# Patient Record
Sex: FEMALE | Race: OTHER | HISPANIC OR LATINO | URBAN - METROPOLITAN AREA
[De-identification: names, ages, dates, MRNs, and addresses within clinical notes are randomized per-mention and may not be internally consistent; named-entity substitution may affect disease eponyms.]

---

## 2020-12-02 ENCOUNTER — EMERGENCY (EMERGENCY)
Facility: HOSPITAL | Age: 45
LOS: 1 days | Discharge: HOME | End: 2020-12-02
Attending: EMERGENCY MEDICINE | Admitting: EMERGENCY MEDICINE
Payer: MEDICAID

## 2020-12-02 VITALS
TEMPERATURE: 97 F | HEART RATE: 78 BPM | RESPIRATION RATE: 16 BRPM | OXYGEN SATURATION: 100 % | SYSTOLIC BLOOD PRESSURE: 129 MMHG | DIASTOLIC BLOOD PRESSURE: 85 MMHG

## 2020-12-02 VITALS
TEMPERATURE: 98 F | SYSTOLIC BLOOD PRESSURE: 132 MMHG | HEART RATE: 82 BPM | RESPIRATION RATE: 18 BRPM | OXYGEN SATURATION: 100 % | DIASTOLIC BLOOD PRESSURE: 90 MMHG

## 2020-12-02 DIAGNOSIS — G93.5 COMPRESSION OF BRAIN: ICD-10-CM

## 2020-12-02 LAB
ALBUMIN SERPL ELPH-MCNC: 4.4 G/DL — SIGNIFICANT CHANGE UP (ref 3.3–5)
ALP SERPL-CCNC: 96 U/L — SIGNIFICANT CHANGE UP (ref 40–120)
ALT FLD-CCNC: 41 U/L — HIGH (ref 4–33)
ANION GAP SERPL CALC-SCNC: 11 MMO/L — SIGNIFICANT CHANGE UP (ref 7–14)
AST SERPL-CCNC: 38 U/L — HIGH (ref 4–32)
BASOPHILS # BLD AUTO: 0.02 K/UL — SIGNIFICANT CHANGE UP (ref 0–0.2)
BASOPHILS NFR BLD AUTO: 0.4 % — SIGNIFICANT CHANGE UP (ref 0–2)
BILIRUB SERPL-MCNC: 0.2 MG/DL — SIGNIFICANT CHANGE UP (ref 0.2–1.2)
BUN SERPL-MCNC: 8 MG/DL — SIGNIFICANT CHANGE UP (ref 7–23)
CALCIUM SERPL-MCNC: 9 MG/DL — SIGNIFICANT CHANGE UP (ref 8.4–10.5)
CHLORIDE SERPL-SCNC: 100 MMOL/L — SIGNIFICANT CHANGE UP (ref 98–107)
CO2 SERPL-SCNC: 26 MMOL/L — SIGNIFICANT CHANGE UP (ref 22–31)
CREAT SERPL-MCNC: 0.53 MG/DL — SIGNIFICANT CHANGE UP (ref 0.5–1.3)
EOSINOPHIL # BLD AUTO: 0.01 K/UL — SIGNIFICANT CHANGE UP (ref 0–0.5)
EOSINOPHIL NFR BLD AUTO: 0.2 % — SIGNIFICANT CHANGE UP (ref 0–6)
GLUCOSE SERPL-MCNC: 116 MG/DL — HIGH (ref 70–99)
HCT VFR BLD CALC: 42.2 % — SIGNIFICANT CHANGE UP (ref 34.5–45)
HGB BLD-MCNC: 14.3 G/DL — SIGNIFICANT CHANGE UP (ref 11.5–15.5)
IMM GRANULOCYTES NFR BLD AUTO: 0.4 % — SIGNIFICANT CHANGE UP (ref 0–1.5)
LYMPHOCYTES # BLD AUTO: 1.29 K/UL — SIGNIFICANT CHANGE UP (ref 1–3.3)
LYMPHOCYTES # BLD AUTO: 25 % — SIGNIFICANT CHANGE UP (ref 13–44)
MCHC RBC-ENTMCNC: 31.1 PG — SIGNIFICANT CHANGE UP (ref 27–34)
MCHC RBC-ENTMCNC: 33.9 % — SIGNIFICANT CHANGE UP (ref 32–36)
MCV RBC AUTO: 91.7 FL — SIGNIFICANT CHANGE UP (ref 80–100)
MONOCYTES # BLD AUTO: 0.31 K/UL — SIGNIFICANT CHANGE UP (ref 0–0.9)
MONOCYTES NFR BLD AUTO: 6 % — SIGNIFICANT CHANGE UP (ref 2–14)
NEUTROPHILS # BLD AUTO: 3.5 K/UL — SIGNIFICANT CHANGE UP (ref 1.8–7.4)
NEUTROPHILS NFR BLD AUTO: 68 % — SIGNIFICANT CHANGE UP (ref 43–77)
NRBC # FLD: 0 K/UL — SIGNIFICANT CHANGE UP (ref 0–0)
PLATELET # BLD AUTO: 219 K/UL — SIGNIFICANT CHANGE UP (ref 150–400)
PMV BLD: 9.7 FL — SIGNIFICANT CHANGE UP (ref 7–13)
POTASSIUM SERPL-MCNC: 4.1 MMOL/L — SIGNIFICANT CHANGE UP (ref 3.5–5.3)
POTASSIUM SERPL-SCNC: 4.1 MMOL/L — SIGNIFICANT CHANGE UP (ref 3.5–5.3)
PROT SERPL-MCNC: 7.9 G/DL — SIGNIFICANT CHANGE UP (ref 6–8.3)
RBC # BLD: 4.6 M/UL — SIGNIFICANT CHANGE UP (ref 3.8–5.2)
RBC # FLD: 12.6 % — SIGNIFICANT CHANGE UP (ref 10.3–14.5)
SARS-COV-2 RNA SPEC QL NAA+PROBE: DETECTED
SODIUM SERPL-SCNC: 137 MMOL/L — SIGNIFICANT CHANGE UP (ref 135–145)
TSH SERPL-MCNC: 1.64 UIU/ML — SIGNIFICANT CHANGE UP (ref 0.27–4.2)
WBC # BLD: 5.15 K/UL — SIGNIFICANT CHANGE UP (ref 3.8–10.5)
WBC # FLD AUTO: 5.15 K/UL — SIGNIFICANT CHANGE UP (ref 3.8–10.5)

## 2020-12-02 PROCEDURE — 70450 CT HEAD/BRAIN W/O DYE: CPT | Mod: 26

## 2020-12-02 PROCEDURE — 70553 MRI BRAIN STEM W/O & W/DYE: CPT | Mod: 26

## 2020-12-02 PROCEDURE — 99220: CPT

## 2020-12-02 PROCEDURE — 99285 EMERGENCY DEPT VISIT HI MDM: CPT

## 2020-12-02 PROCEDURE — 72156 MRI NECK SPINE W/O & W/DYE: CPT | Mod: 26

## 2020-12-02 RX ORDER — SODIUM CHLORIDE 9 MG/ML
1000 INJECTION INTRAMUSCULAR; INTRAVENOUS; SUBCUTANEOUS ONCE
Refills: 0 | Status: COMPLETED | OUTPATIENT
Start: 2020-12-02 | End: 2020-12-02

## 2020-12-02 RX ORDER — METOCLOPRAMIDE HCL 10 MG
10 TABLET ORAL ONCE
Refills: 0 | Status: COMPLETED | OUTPATIENT
Start: 2020-12-02 | End: 2020-12-02

## 2020-12-02 RX ADMIN — Medication 10 MILLIGRAM(S): at 07:59

## 2020-12-02 RX ADMIN — SODIUM CHLORIDE 1000 MILLILITER(S): 9 INJECTION INTRAMUSCULAR; INTRAVENOUS; SUBCUTANEOUS at 07:59

## 2020-12-02 NOTE — ED CDU PROVIDER DISPOSITION NOTE - NSFOLLOWUPCLINICS_GEN_ALL_ED_FT
[facility]  Neurology Autoimmune Encephalitis  [address] [place] [id number]  Phone: [phone]  Fax: [phone]  Follow Up Time: 4-6 Days

## 2020-12-02 NOTE — ED PROVIDER NOTE - OBJECTIVE STATEMENT
[place] : [id number] Vietnamese speaking 46 yo F with PMHX [place] : [phone] German speaking   44 yo non smoking F with no sig PMHX or PSHX here with complaints of gradual onset, worsening headache, pt locates to entire head but on top and back the most. It is associated with nausea and seems to be worse at night. Has never experienced headaches in the past. Taking Tylenol with no relief.  Denies vomiting, visual changes/ blurry vision, ear pain, tinnitus, weakness, numbness, tingling, dizziness, off balance, cp, sob, cough, congestion, fever, chills, neck pain, abdominal pain, urinary symptoms. [place] : [id number] Serbian speaking   46 yo non smoking F with no sig PMHX or PSHX here with complaints of gradual onset, worsening headache, pt locates to entire head but on top and back the most. It is associated with nausea and seems to be worse at night. Has never experienced headaches in the past. Taking Tylenol with no relief.  Denies vomiting, visual changes/ blurry vision, ear pain, tinnitus, weakness, numbness, tingling, dizziness, off balance, trauma, falls, cp, sob, cough, congestion, fever, chills, neck pain, abdominal pain, urinary symptoms. [place] : [phone] Dutch speaking   44 yo non smoking F with no sig PMHX or PSHX here with complaints of gradual onset, worsening headache, pt locates to entire head but on top and back the most. It is associated with nausea and seems to be worse at night. Has never experienced headaches in the past. Taking Tylenol with no relief.  Denies vomiting, visual changes/ blurry vision, ear pain, tinnitus, weakness, numbness, tingling, dizziness, off balance, trauma, falls, cp, sob, cough, congestion, fever, chills, neck pain, abdominal pain, urinary symptoms.  Attending - Agree with above.  I evaluated patient myself. 44 y/o F Dutch speaking.  Interpretor #[id number] used for history.  c/o b/l parieto-occip headache x 4 days.  Took Tylenol with little relief.  Denies history of headaches in past.  No recent trauma.  Denies associated dizziness, double or blurry vision, weakness or numbness.  Denies any other complaint.  Feels better currently post medication in ED.  Denies cough, fever, or covid diagnosis.

## 2020-12-02 NOTE — ED CDU PROVIDER DISPOSITION NOTE - CLINICAL COURSE
Patient is a 45 y.o female with no known PMHx who presented to ED c/o headache x 4 days. Pt seen and worked up in ED; Labs wnl.  CT head showing ?low lying right cerebellar tonsil ?chiari 1 - Pt seen by neurology and recommending MRI cervical spine and head ww/o contrast. Pt. placed in CDU for observation, pain control and MRIs. Pt incidentally noted to be COVID+. MRI showing arachnoid cyst. Spoke to neurosurgery who states that patient can be DC'd home and f/u outpatient. Pt DC'D w/ neuro f/u and COVID quarantine instructions. All questions and concerns addressed. No complaints noted.

## 2020-12-02 NOTE — ED PROVIDER NOTE - PHYSICAL EXAMINATION
Vital signs reviewed.   CONSTITUTIONAL: Well-appearing; well-nourished; in no apparent distress. Non-toxic appearing.   HEAD: Normocephalic, atraumatic.  EYES: PERRL, EOM intact, conjunctiva and sclera WNL.  ENT: normal nose; no rhinorrhea; normal pharynx with no tonsillar hypertrophy, no erythema, no exudate, no lymphadenopathy.  NECK/LYMPH: Supple; non-tender; no cervical lymphadenopathy.  CARD: Normal S1, S2; no murmurs, rubs, or gallops noted.  RESP: Normal chest excursion with respiration; breath sounds clear and equal bilaterally; no wheezes, rhonchi, or rales.  ABD/GI: soft, non-distended; non-tender; no palpable organomegaly, no pulsatile mass.  EXT/MS: moves all extremities; distal pulses are normal, no pedal edema.  SKIN: Normal for age and race; warm; dry; good turgor; no apparent lesions or exudate noted.  NEURO: Awake, alert, oriented x 3, no gross deficits, CN II-XII grossly intact, no motor or sensory deficit noted.  PSYCH: Normal mood; appropriate affect. Vital signs reviewed.   CONSTITUTIONAL: Well-appearing; well-nourished; in no apparent distress. Non-toxic appearing.   HEAD: Normocephalic, atraumatic.  EYES: PERRL, EOM intact, conjunctiva and sclera WNL.  ENT: normal nose; no rhinorrhea; normal pharynx with no tonsillar hypertrophy, no erythema, no exudate, no lymphadenopathy.  NECK/LYMPH: Supple; non-tender; no cervical lymphadenopathy.  CARD: Normal S1, S2; no murmurs, rubs, or gallops noted.  RESP: Normal chest excursion with respiration; breath sounds clear and equal bilaterally; no wheezes, rhonchi, or rales.  ABD/GI: soft, non-distended; non-tender; no palpable organomegaly, no pulsatile mass.  EXT/MS: moves all extremities; distal pulses are normal, no pedal edema.  SKIN: Normal for age and race; warm; dry; good turgor; no apparent lesions or exudate noted.  NEURO: Awake, alert, oriented x 3, no gross deficits, CN II-XII grossly intact, no motor or sensory deficit noted.   PSYCH: Normal mood; appropriate affect.  ATTENDING PHYSICAL EXAM  GEN - NAD; well appearing; A+O x3  HEAD - NC/AT; EYES/NOSE - PERRL, EOMI, mucous membranes moist, no discharge; THROAT: Oral cavity and pharynx normal. No inflammation, swelling, exudate, or lesions  NECK: Neck supple, non-tender without lymphadenopathy, no masses, no JVD  PULMONARY - CTA b/l, symmetric breath sounds  CARDIAC -s1s2, RRR, no M,R,G  ABDOMEN - +BS, ND, NT, soft, no guarding, no rebound, no masses   BACK - no CVA tenderness, No vertebral or paravertebral tenderness  EXTREMITIES - symmetric pulses, 2+ dp, capillary refill < 2 seconds, no clubbing, no cyanosis, no edema  SKIN - no rash or bruising   NEUROLOGIC - alert, CN 2-12 intact, reflexes nl, sensation nl, coordination nl, finger to nose nl, romberg negative, motor 5/5 RUE/LUE/RLE/LLE/EHL/Plantar flexion, no pronator drift, gait nl

## 2020-12-02 NOTE — ED PROVIDER NOTE - CARE PLAN
Principal Discharge DX:	Headache   Principal Discharge DX:	Headache  Secondary Diagnosis:	Abnormal CT of brain

## 2020-12-02 NOTE — ED ADULT NURSE NOTE - CHIEF COMPLAINT QUOTE
Patient c/o headache, nausea x4 days. Denies any vomiting. Patient denies any fevers, cough, shortness of breath. Denies any changes in vision, dizziness. Patient reports headache only when she is about to go to sleep. Denies any past medical history. Patient last took Tylenol 4am with no relief.

## 2020-12-02 NOTE — ED CDU PROVIDER DISPOSITION NOTE - PATIENT PORTAL LINK FT
You can access the FollowMyHealth Patient Portal offered by [facility] by registering at the following website: [email or web address]. By joining [facility]’s FollowMyHealth portal, you will also be able to view your health information using other applications (apps) compatible with our system.

## 2020-12-02 NOTE — ED CDU PROVIDER DISPOSITION NOTE - NSFOLLOWUPINSTRUCTIONS_ED_ALL_ED_FT
Se le aconseja al paciente que tree un seguimiento con parra médico de atención primaria en 1-2 días y con Neurología dentro de tejas semana y se le dice que regrese al departamento de emergencias inmediatamente por cualquier síntoma nuevo o preocupante leisa empeoramiento del dolor de patel, mareos, náuseas, vómitos, cambios en la visión O CUALQUIER OTRO QUEJAS. El paciente está de acuerdo con el plan.      Puede yolette tylenol 650 mg cada 4-6 horas según sea necesario para el dolor  Descansa, mantente hidratado  neurología (puede hacer un seguimiento en la clínica, llame al [phone])    Actividad avanzada según la tolerancia. Continúe con todos los medicamentos recetados previamente según las indicaciones a menos que se le indique lo contrario. Tree un seguimiento con parra médico de atención primaria en 48-72 horas; traiga copias de ovi resultados. Regrese a la melissa de emergencias si los síntomas empeoran o persisten, y / o CUALQUIER SÍNTOMA NUEVA O ASUNTO Si tiene problemas para obtener un seguimiento, llame al: [phone] ([phone] para obtener un médico o especialista que acepte parra seguro en parra área. Puede llamar al [phone] para hacer tejas carlos con la clínica de medicina interna.    Patient advised to follow up with primary care doctor in 1-2 days and Neurology within week and told to return to the emergency department immediately for any new or concerning symptoms such as worsening headache, dizziness, nausea, vomiting, changes in vision OR ANY OTHER COMPLAINTS. Patient agrees with plan.      Can take tylenol 650 mg every 4-6 hours as needed for pain   Rest, stay hydrated   neurology (you may follow up in the clinic, please call [phone])    Advance activity as tolerated.  Continue all previously prescribed medications as directed unless otherwise instructed.  Follow up with your primary care physician in 48-72 hours- bring copies of your results.  Return to the ER for worsening or persistent symptoms, and/or ANY NEW OR CONCERNING SYMPTOMS. If you have issues obtaining follow up, please call: [phone] to obtain a doctor or specialist who takes your insurance in your area.  You may call [phone] to make an appointment with the internal medicine clinic.

## 2020-12-02 NOTE — CONSULT NOTE ADULT - ASSESSMENT
46 yo Beninese speaking female with no known PMH presenting with subacute onset headache of 4 days duration with gradual worsening and no improvement with tylenol. Physical exam without focal neurological deficits, and head CT with findings of low lying right cerebellar tonsil, suggestive of possible Chiari I malformation. Ventricles also appear slightly small on own review of CT. Quality and localization of headache suggestive of general tension type headache, though with CT findings, cannot rule out Chiari I malformation vs possible CSF leak as cause of headache. Given more gradual duration of headache with lack of improvement with basic OTC medications, differential also includes possible intracranial mass vs cervicogenic headache, though less likely given lack of other focal deficits.    Recommendations:  [] MRI w/ and w/o head and c-spine to help r/o intracranial mass, evaluate for cervicogenic headache and evaluate for possible Chiari I malformation  [] symptomatic treatment; improving with metoclopramide currently, can also try toradol, IV magnesium if continuing  [] neurosurgery evaluation given concern for possible Chiari I malformation.  [] further recommendations pending imaging results    Case to be discussed with neurology attending Dr. Ruby. 44 yo Trinidadian speaking female with no known PMH presenting with subacute onset headache of 4 days duration with gradual worsening and no improvement with tylenol. Physical exam without focal neurological deficits, and head CT with findings of low lying right cerebellar tonsil, suggestive of possible Chiari I malformation. Ventricles also appear slightly small on own review of CT. Quality and localization of headache suggestive of general tension type headache, though with CT findings, cannot rule out Chiari I malformation vs possible CSF leak as cause of headache. Given more gradual duration of headache with lack of improvement with basic OTC medications, differential also includes possible intracranial mass vs cervicogenic headache, though less likely given lack of other focal deficits.    Recommendations:  [] MRI w/ and w/o head and c-spine to help r/o intracranial mass, evaluate for cervicogenic headache and evaluate for possible Chiari I malformation  [] symptomatic treatment; improving with metoclopramide currently, can also try toradol, IV magnesium if continuing  [] neurosurgery evaluation given concern for possible Chiari I malformation.  [] further recommendations pending imaging results    Case discussed  with neurology attending Dr. Ruby.

## 2020-12-02 NOTE — CONSULT NOTE ADULT - SUBJECTIVE AND OBJECTIVE BOX
*************************************  NEUROLOGY CONSULT  SERVICE  **************************************    KELLI KAUFFMAN  Female  MRN-[id number]    HPI:  46 yo Occitan speaking female with no known PMH who presented to the ED due to continued worsening headache. [place]  [id number] used by myself and ED provider to speak with patient. Patient notes that she started having bandlike aching headache about 4 days ago in the back and top of her head. She feels pain from the headache on both sides of the head and the headache has gradually been getting worse over the last several days to a maximum of about 6/10 in intensity. She tried tylenol to relieve pain, but notes that it didn't really help. She was previously nauseated, but has not vomited and notes that the headache is often worse at night. She denies any vision changes or double vision, ear pain or changes in hearing, tinnitus, current nausea, dizziness or feelings of imbalance, weakness in the face or extremities, numbness or tingling, neck pain. She denies any recent travel or recent illness and has not had headaches like this in the past. She has not had any recent trauma or falls. Last menstrual period was around Nov. 15th. She was given metoclopramide here in the ED and states that her headache is improving, to about a 3/10 in intensity. CT head was done that showed protrusion of right cerebellar tonsil below foramen magnum.      ROS: All negative except as mentioned in HPI    PAST MEDICAL & SURGICAL HISTORY:  No pertinent past medical history    No significant past surgical history      MEDICATIONS  (STANDING):    MEDICATIONS  (PRN):    Allergies    No Known Allergies    Intolerances        VITAL SIGNS:  Vital Signs Last 24 Hrs  T(C): 36.1 (02 Dec 2020 06:52), Max: 36.1 (02 Dec 2020 06:52)  T(F): 97 (02 Dec 2020 06:52), Max: 97 (02 Dec 2020 06:52)  HR: 78 (02 Dec 2020 06:52) (78 - 78)  BP: 129/85 (02 Dec 2020 06:52) (129/85 - 129/85)  BP(mean): --  RR: 16 (02 Dec 2020 06:52) (16 - 16)  SpO2: 100% (02 Dec 2020 06:52) (100% - 100%)    PHYSICAL EXAMINATION:  General: Well-developed, well nourished, in no acute distress.  Eyes: Conjunctiva and sclera clear.  Neck: Supple, nontender  Lung: no respiratory distress noted    Neurologic:  - Mental Status:  Alert, awake, oriented to person, place, and time; Speech is fluent with intact naming, repetition, and comprehension; follows complex commands, no extinction or neglect noted;   - Cranial Nerves II-XII:  VFF, No nystagmus noted, EOMI, PERRLA, V1-V3 intact, no facial asymmetry, t/p midline, SCM/trap intact.  - Motor: Normal muscle bulk and tone throughout. No myoclonus or tremor. Strength is 5/5 bilaterally in all muscle groups in the UEs and LEs.  - Reflexes: 2+ biceps, brachioradialis, patellar, ankle reflexes bilaterally. Plantar responses flexor.  - Sensory:  Intact to light touch, pinprick, vibration throughout.  - Coordination:  Finger-nose-finger without dysmetria.  Rapid alternating hand movements intact.  - Gait:   Normal steps, base, arm swing, and turning. Romberg testing is negative.    LABS:                          14.3   5.15  )-----------( 219      ( 02 Dec 2020 08:00 )             42.2     12-02    137  |  100  |  8   ----------------------------<  116<H>  4.1   |  26  |  0.53    Ca    9.0      02 Dec 2020 08:00    TPro  7.9  /  Alb  4.4  /  TBili  0.2  /  DBili  x   /  AST  38<H>  /  ALT  41<H>  /  AlkPhos  96  12-02      RADIOLOGY & ADDITIONAL STUDIES:    CT Head No Cont (12.02.20 @ 09:04)  IMPRESSION:    No evidence of acute infarct, intracranial hemorrhage or mass effect.    Crowding of the foramen magnum with low-lying right cerebellar tonsil as described. Further correlation with MRI of the brain is recommended to exclude Chiari I malformation.

## 2020-12-02 NOTE — ED CDU PROVIDER INITIAL DAY NOTE - OBJECTIVE STATEMENT
[place] : [phone] Mongolian speaking   46 yo non smoking F with no sig PMHX or PSHX here with complaints of gradual onset, worsening headache, pt locates to entire head but on top and back the most. It is associated with nausea and seems to be worse at night. Has never experienced headaches in the past. Taking Tylenol with no relief.  Denies vomiting, visual changes/ blurry vision, ear pain, tinnitus, weakness, numbness, tingling, dizziness, off balance, trauma, falls, cp, sob, cough, congestion, fever, chills, neck pain, abdominal pain, urinary symptoms.  Attending - Agree with above.  I evaluated patient myself. 44 y/o F Mongolian speaking.  Interpretor #[id number] used for history.  c/o b/l parieto-occip headache x 4 days.  Took Tylenol with little relief.  Denies history of headaches in past.  No recent trauma.  Denies associated dizziness, double or blurry vision, weakness or numbness.  Denies any other complaint.  Feels better currently post medication in ED.  Denies cough, fever, or covid diagnosis. [place] : [phone] Surinamese speaking   46 yo non smoking F with no sig PMHX or PSHX here with complaints of gradual onset, worsening headache, pt locates to entire head but on top and back the most. It is associated with nausea and seems to be worse at night. Has never experienced headaches in the past. Taking Tylenol with no relief.  Denies vomiting, visual changes/ blurry vision, ear pain, tinnitus, weakness, numbness, tingling, dizziness, off balance, trauma, falls, cp, sob, cough, congestion, fever, chills, neck pain, abdominal pain, urinary symptoms.  Attending - Agree with above.  I evaluated patient myself. 44 y/o F Surinamese speaking.  Interpretor #[id number] used for history.  c/o b/l parieto-occip headache x 4 days.  Took Tylenol with little relief.  Denies history of headaches in past.  No recent trauma.  Denies associated dizziness, double or blurry vision, weakness or numbness.  Denies any other complaint.  Feels better currently post medication in ED.  Denies cough, fever, or covid diagnosis.    CDU Note: Agree with above. Surinamese speaking female - [facility] ID [id number] - patient presenting to ER c/o headache x 4 days. In Er has CT head showing ?low lying right cerebellar tonsil ?chiari 1 - seen by neurology and recommending MRI cervical spine and head ww/o contrast. Pt. placed in CDU for observation, pain control and MRIs. Pt. resenting comfortably without complaints.

## 2020-12-02 NOTE — ED ADULT TRIAGE NOTE - CHIEF COMPLAINT QUOTE
Patient c/o headache, nausea x4 days. Denies any vomiting. Patient denies any fevers, cough, shortness of breath. Denies any changes in vision, dizziness. Patient reports headache only when she is about to go to sleep. Denies any past medical history. Patient last took Tylenol 4am with no relief.
detailed exam

## 2020-12-02 NOTE — ED PROVIDER NOTE - PROGRESS NOTE DETAILS
CT with ? chiari malformation  Neuro consulted on patient in the ED. recs MRI w/wo brain and cervical spine   Neurosurg aware will see, agree with MRIs.   [place]  [id number] used for translation to provide patient with results and Update on plan. Discussion with Patient about stay in CDU for MRI to further evaluate brain and neck, pt amendable to staying, aware that she requires COVID swab.

## 2020-12-02 NOTE — CONSULT NOTE ADULT - NSHPATTENDINGPLANDISCUSS_GEN_ALL_CORE
neurology resident and I agree with plan above and MRI brain and cervical spine reviewed with neuroradiology and showed an arachnoid cyst and mild cerebellar tonsillar ectopia. She can follow up as outpatient in our office in 4-6 weeks. [phone]. Case discussed with CDU PA.

## 2020-12-02 NOTE — ED CDU PROVIDER INITIAL DAY NOTE - PHYSICAL EXAMINATION
Vital signs reviewed.   CONSTITUTIONAL: Well-appearing; well-nourished; in no apparent distress. Non-toxic appearing.   HEAD: Normocephalic, atraumatic.  EYES: PERRL, EOM intact, conjunctiva and sclera WNL.  ENT: normal nose; no rhinorrhea; normal pharynx with no tonsillar hypertrophy, no erythema, no exudate, no lymphadenopathy.  NECK/LYMPH: Supple; non-tender; no cervical lymphadenopathy.  CARD: Normal S1, S2; no murmurs, rubs, or gallops noted.  RESP: Normal chest excursion with respiration; breath sounds clear and equal bilaterally; no wheezes, rhonchi, or rales.  ABD/GI: soft, non-distended; non-tender; no palpable organomegaly, no pulsatile mass.  EXT/MS: moves all extremities; distal pulses are normal, no pedal edema.  SKIN: Normal for age and race; warm; dry; good turgor; no apparent lesions or exudate noted.  NEURO: Awake, alert, oriented x 3, no gross deficits, CN II-XII grossly intact, no motor or sensory deficit noted.   PSYCH: Normal mood; appropriate affect.  ATTENDING PHYSICAL EXAM  GEN - NAD; well appearing; A+O x3  HEAD - NC/AT; EYES/NOSE - PERRL, EOMI, mucous membranes moist, no discharge; THROAT: Oral cavity and pharynx normal. No inflammation, swelling, exudate, or lesions  NECK: Neck supple, non-tender without lymphadenopathy, no masses, no JVD  PULMONARY - CTA b/l, symmetric breath sounds  CARDIAC -s1s2, RRR, no M,R,G  ABDOMEN - +BS, ND, NT, soft, no guarding, no rebound, no masses   BACK - no CVA tenderness, No vertebral or paravertebral tenderness  EXTREMITIES - symmetric pulses, 2+ dp, capillary refill < 2 seconds, no clubbing, no cyanosis, no edema  SKIN - no rash or bruising   NEUROLOGIC - alert, CN 2-12 intact, reflexes nl, sensation nl, coordination nl, finger to nose nl, romberg negative, motor 5/5 RUE/LUE/RLE/LLE/EHL/Plantar flexion, no pronator drift, gait nl

## 2020-12-02 NOTE — ED CDU PROVIDER INITIAL DAY NOTE - DETAILS
46 y/o female c/o headache x 4 days w/ abnormal CT findings  -neurology consult  -mri head and c-spine w/w/o  -pain control

## 2020-12-02 NOTE — ED PROVIDER NOTE - CLINICAL SUMMARY MEDICAL DECISION MAKING FREE TEXT BOX
44 yo non smoking F with no sig PMHX or PSHX here with complaints of gradual onset, worsening headache, pt locates to entire head but on top and back the most. It is associated with nausea and seems to be worse at night. Has never experienced headaches in the past. Taking Tylenol with no relief  new headache. no neuro deficits, fever or neck pain.  plan labs, ct head r/o mass, fluids reglan reassess.

## 2020-12-02 NOTE — ED ADULT NURSE NOTE - OBJECTIVE STATEMENT
jennifer came to Er with c/o headache for 4 days.  took tylenol with no relief. pt denies any numbness or tingling, any vision changes. pt is alert and oriented. Romanian speaking.  phone used. #[id number]

## 2020-12-02 NOTE — ED CDU PROVIDER INITIAL DAY NOTE - PROGRESS NOTE DETAILS
Patient received at sign out. MRI results back. Results negative for acute findings besides arachnoid cyst and cerebellar tonsillar ectopia. Neurosurgery states patient can be DC'd. DC'd results given via  # [id number]. Pt given strict covid instructions and also neurosurgery f/u. All questions and concerns addressed.

## 2020-12-08 PROBLEM — Z78.9 OTHER SPECIFIED HEALTH STATUS: Chronic | Status: ACTIVE | Noted: 2020-12-02

## 2020-12-16 ENCOUNTER — APPOINTMENT (OUTPATIENT)
Dept: NEUROLOGY | Facility: CLINIC | Age: 45
End: 2020-12-16

## 2020-12-16 PROBLEM — Z00.00 ENCOUNTER FOR PREVENTIVE HEALTH EXAMINATION: Status: ACTIVE | Noted: 2020-12-16

## 2021-04-05 ENCOUNTER — APPOINTMENT (OUTPATIENT)
Dept: DISASTER EMERGENCY | Facility: OTHER | Age: 46
End: 2021-04-05

## 2021-04-26 ENCOUNTER — APPOINTMENT (OUTPATIENT)
Dept: DISASTER EMERGENCY | Facility: OTHER | Age: 46
End: 2021-04-26

## 2021-11-20 NOTE — CONSULT NOTE ADULT - PROBLEM SELECTOR RECOMMENDATION 9
1. MRI Brain/Cspine r/o Syrinx   2. Likely outpatient follow up with Dr. Damico  Case d/w Dr. Damico Opt out

## 2022-09-10 NOTE — ED PROVIDER NOTE - ATTESTATION, MLM
Goal Outcome Evaluation:    Plan of Care Reviewed With: patient     Overall Patient Progress: no change    Reason for Admission: Cerebral Abscess     Cognitive/Mentation: A/Ox person, place, situation. Disoriented x place. Forgetful at times.  Neuros/CMS: Intact ex BUE 4/5, BLE 3/5, generalized weakness. Does not always follow commands.  VS: VSS. SBP goal <180.  GI: BS active, last BM 9/10/2022. Incontinent. Loose, watery stools. (+) C-diff.  : Rash in perineum, improves with barrier cream. Incontinent.  Pulmonary: LS clear.  Pain: Head pain, improves with PRN tylenol, rest, reposition.      Drains/Lines: R PICC S.L.  Skin: Sacrum/Coccyx and perineum redness and redness/rash- skin barrier cream applied.  Activity: Assist x 2 with lift. T/R Q2h.  Diet: NPO- PEG tube in place, running @50ml/hr, 200ml free water flush Q4h. Takes pills in PEG.      Therapies recs: TCU  Discharge: Pending     Aggression Stoplight Tool: GREEN     End of shift summary: Enteric/Contact isolation maintained r/t (+) C-diff and MRSA. Palliative consult pending.       I have reviewed and confirmed nurses' notes for patient's medications, allergies, medical history, and surgical history.
